# Patient Record
Sex: MALE | Race: WHITE | HISPANIC OR LATINO | Employment: OTHER | URBAN - METROPOLITAN AREA
[De-identification: names, ages, dates, MRNs, and addresses within clinical notes are randomized per-mention and may not be internally consistent; named-entity substitution may affect disease eponyms.]

---

## 2017-10-20 ENCOUNTER — HOSPITAL ENCOUNTER (EMERGENCY)
Facility: CLINIC | Age: 22
Discharge: HOME | End: 2017-10-21
Attending: EMERGENCY MEDICINE | Admitting: EMERGENCY MEDICINE

## 2017-10-20 DIAGNOSIS — T15.02XA FOREIGN BODY OF LEFT CORNEA, INITIAL ENCOUNTER: ICD-10-CM

## 2017-10-20 PROCEDURE — 65220 REMOVE FOREIGN BODY FROM EYE: CPT | Mod: LT

## 2017-10-20 PROCEDURE — 99284 EMERGENCY DEPT VISIT MOD MDM: CPT | Mod: 25

## 2017-10-20 RX ORDER — TETRACAINE HYDROCHLORIDE 5 MG/ML
SOLUTION OPHTHALMIC
Status: DISCONTINUED
Start: 2017-10-20 | End: 2017-10-21 | Stop reason: HOSPADM

## 2017-10-20 RX ORDER — ERYTHROMYCIN 5 MG/G
0.5 OINTMENT OPHTHALMIC 4 TIMES DAILY
Qty: 1 G | Refills: 0 | Status: SHIPPED | OUTPATIENT
Start: 2017-10-20 | End: 2017-10-25

## 2017-10-20 ASSESSMENT — ENCOUNTER SYMPTOMS
EYE PAIN: 1
EYE ITCHING: 1
EYE DISCHARGE: 0

## 2017-10-20 NOTE — ED AVS SNAPSHOT
[facility] Emergency Department    [address]    [facility] [address]    Phone:  [phone]    Fax:  [phone]                                       Castillo Bullard   MRN: [id number]    Department:  [facility] Emergency Department   Date of Visit:  10/20/2017           After Visit Summary Signature Page     I have received my discharge instructions, and my questions have been answered. I have discussed any challenges I see with this plan with the nurse or doctor.    ..........................................................................................................................................  Patient/Patient Representative Signature      ..........................................................................................................................................  Patient Representative Print Name and Relationship to Patient    ..................................................               ................................................  Date                                            Time    ..........................................................................................................................................  Reviewed by Signature/Title    ...................................................              ..............................................  Date                                                            Time

## 2017-10-20 NOTE — ED AVS SNAPSHOT
[facility] Emergency Department    [address]    [facility] [address]    Phone:  [phone]    Fax:  [phone]                                       Castillo Bullard   MRN: [id number]    Department:  [facility] Emergency Department   Date of Visit:  10/20/2017           Patient Information     Date Of Birth          1995        Your diagnoses for this visit were:     Foreign body of left cornea, initial encounter        You were seen by Basilio No MD.      Follow-up Information     Follow up with [facility] OPHTHALMOLOGY DEPT. Go in 1 day.    Contact information:    [address]  [address]  [facility] [phone]        Follow up with [facility] Emergency Department.    Specialty:  EMERGENCY MEDICINE    Why:  If symptoms worsen    Contact information:    [address] [facility] [id number] [phone]        Discharge Instructions       Go to your appointment tomorrow at 9 am.      Particle Removed from Eye with Rust Ring (Corneal Foreign Body)    A metal particle got into the eye and stuck to the cornea (the clear part in the front of the eye). This sensitive area can be very painful after a foreign body is removed. However, it heals rapidly. The pain should go away within 24 to 48 hours.  The metal particle has likely been removed, but tears have caused the metal to rust and form a stain on the cornea.  Many times it is not possible to completely remove a rust ring in a single visit. Therefore, you may have to return for another appointment or be referred to an eye specialist for further treatment.  Home care    A cold pack (ice in a plastic bag, wrapped in a towel) may be applied over the eye for 20 minutes at a time to reduce pain.    Acetaminophen or ibuprofen may be used to control pain, unless another medicine was prescribed. (Note: If you have chronic liver or  kidney disease, or if you have ever had a stomach ulcer or gastrointestinal bleeding, talk with your doctor before using these medicines.)    If you received an eye patch:    An eye patch should not be left in place for more than 24 hours, unless advised to do so by your healthcare provider.    Always keep your return appointment for patch removal and re-exam. Your eye could be harmed if the patch remains in place longer than advised.    Don't drive a motor vehicle or operate machinery with the patch in place. You will have trouble judging distances with only one eye.    If eye drops or ointment were prescribed, use as directed.  Follow-up care    No eye patch: If no patch was used, but the pain continues for more than 48 hours, you should have another eye exam.    Eye patch: If your eye was patched and you were given a return appointment for patch removal and re-exam, don't miss the visit. Again, it could be harmful to your eye if the patch remains in place longer than advised. However, if you were asked to remove the eye patch within 24 hours (or as directed by your healthcare provider), contact your healthcare provider immediately if your pain increases or get worse after removal of the eye patch.  When to seek medical advice  Call your healthcare provider right away if any of these occur:    Increasing eye pain or pain that does not improve after 24 hours    Discharge from the eye    Redness of the eye or swelling of the eyelids    Worsening vision  Date Last Reviewed: 7/1/2017 2000-2017 [employer]. [address] [place] [address]. All rights reserved. This information is not intended as a substitute for professional medical care. Always follow your healthcare professional's instructions.          24 Hour Appointment Hotline       To make an appointment at any [facility], call [phone] ([phone]). If you don't have a family doctor or clinic, we will help you find one.  [facility] are conveniently located to serve the needs of you and your family.             Review of your medicines      START taking        Dose / Directions Last dose taken    erythromycin ophthalmic ointment   Commonly known as:  ROMYCIN   Dose:  0.5 inch   Quantity:  1 g        Place 0.5 inches Into the left eye 4 times daily for 5 days   Refills:  0        oxyCODONE 5 MG IR tablet   Commonly known as:  ROXICODONE   Dose:  5 mg   Quantity:  8 tablet        Take 1 tablet (5 mg) by mouth every 6 hours as needed for pain   Refills:  0                Prescriptions were sent or printed at these locations (2 Prescriptions)                   Other Prescriptions                Printed at Department/Unit printer (2 of 2)         erythromycin (ROMYCIN) ophthalmic ointment               oxyCODONE (ROXICODONE) 5 MG IR tablet                Procedures and tests performed during your visit     CT Temporal Orbital Sella w/o Contrast      Orders Needing Specimen Collection     None      Pending Results     Date and Time Order Name Status Description    10/20/2017 2334 CT Temporal Orbital Sella w/o Contrast Preliminary             Pending Culture Results     No orders found for last 3 day(s).            Pending Results Instructions     If you had any lab results that were not finalized at the time of your Discharge, you can call the ED Lab Result RN at [phone]. You will be contacted by this team for any positive Lab results or changes in treatment. The nurses are available 7 days a week from 10A to 6:30P.  You can leave a message 24 hours per day and they will return your call.        Test Results From Your Hospital Stay        10/21/2017 12:38 AM      Narrative     CT ORBITS WITHOUT CONTRAST  10/21/2017 12:16 AM     HISTORY: The patient works in construction with metals and woods. Left  eye irritation and itchiness. Evaluate for foreign body.    COMPARISON: 5/20/2014 - CT head.    TECHNIQUE: Without intravenous  contrast, helical sections were  acquired through the orbits. Coronal reconstructions were generated.  Radiation dose for this scan was reduced using automated exposure  control, adjustment of the mA and/or kV according to the patient's  size, or iterative reconstruction technique.        Impression     IMPRESSION:  1. No radiopaque foreign object or other acute abnormality visualized  within the orbits.  2. Mild mucosal thickening within bilateral maxillary and ethmoid  sinuses.                Clinical Quality Measure: Blood Pressure Screening     Your blood pressure was checked while you were in the emergency department today. The last reading we obtained was  BP: (!) 133/100 . Please read the guidelines below about what these numbers mean and what you should do about them.  If your systolic blood pressure (the top number) is less than 120 and your diastolic blood pressure (the bottom number) is less than 80, then your blood pressure is normal. There is nothing more that you need to do about it.  If your systolic blood pressure (the top number) is 120-139 or your diastolic blood pressure (the bottom number) is 80-89, your blood pressure may be higher than it should be. You should have your blood pressure rechecked within a year by a primary care provider.  If your systolic blood pressure (the top number) is 140 or greater or your diastolic blood pressure (the bottom number) is 90 or greater, you may have high blood pressure. High blood pressure is treatable, but if left untreated over time it can put you at risk for heart attack, stroke, or kidney failure. You should have your blood pressure rechecked by a primary care provider within the next 4 weeks.  If your provider in the emergency department today gave you specific instructions to follow-up with your doctor or provider even sooner than that, you should follow that instruction and not wait for up to 4 weeks for your follow-up visit.        Thank you for  "choosing [facility]       Thank you for choosing [facility] for your care. Our goal is always to provide you with excellent care. Hearing back from our patients is one way we can continue to improve our services. Please take a few minutes to complete the written survey that you may receive in the mail after you visit with us. Thank you!        [employer] Information     [employer] lets you send messages to your doctor, view your test results, renew your prescriptions, schedule appointments and more. To sign up, go to [email or web address] . Click on \"Log in\" on the left side of the screen, which will take you to the Welcome page. Then click on \"Sign up Now\" on the right side of the page.     You will be asked to enter the access code listed below, as well as some personal information. Please follow the directions to create your username and password.     Your access code is: [email or web address]  Expires: 2018 12:50 AM     Your access code will  in 90 days. If you need help or a new code, please call your [facility] clinic or [phone].        Care EveryWhere ID     This is your Care EveryWhere ID. This could be used by other organizations to access your [facility] medical records  [id number]        Equal Access to Services     LEONARD RUELAS : Jose Alejandro garceso Whitney, waaxda lujaiadaha, qaybta kaalmada adebenitoyada, ugo hand. So [facility] [phone].    ATENCIÓN: Si habla español, tiene a anaya disposición servicios gratuitos de asistencia lingüística. Llame al [phone].    We comply with applicable federal civil rights laws and [place] laws. We do not discriminate on the basis of race, color, national origin, age, disability, sex, sexual orientation, or gender identity.            After Visit Summary       This is your record. Keep this with you and show to your community pharmacist(s) and doctor(s) at your next visit.                  "

## 2017-10-21 ENCOUNTER — APPOINTMENT (OUTPATIENT)
Dept: CT IMAGING | Facility: CLINIC | Age: 22
End: 2017-10-21
Attending: EMERGENCY MEDICINE

## 2017-10-21 ENCOUNTER — OFFICE VISIT (OUTPATIENT)
Dept: OPHTHALMOLOGY | Facility: CLINIC | Age: 22
End: 2017-10-21

## 2017-10-21 VITALS
SYSTOLIC BLOOD PRESSURE: 127 MMHG | RESPIRATION RATE: 16 BRPM | OXYGEN SATURATION: 98 % | HEART RATE: 86 BPM | DIASTOLIC BLOOD PRESSURE: 87 MMHG | TEMPERATURE: 97.8 F

## 2017-10-21 DIAGNOSIS — T15.02XA FOREIGN BODY OF LEFT CORNEA, INITIAL ENCOUNTER: Primary | ICD-10-CM

## 2017-10-21 PROCEDURE — 70480 CT ORBIT/EAR/FOSSA W/O DYE: CPT

## 2017-10-21 RX ORDER — OFLOXACIN 3 MG/ML
1 SOLUTION/ DROPS OPHTHALMIC EVERY 4 HOURS
Qty: 1 BOTTLE | Refills: 11 | Status: SHIPPED | OUTPATIENT
Start: 2017-10-21

## 2017-10-21 RX ORDER — OXYCODONE HYDROCHLORIDE 5 MG/1
5 TABLET ORAL EVERY 6 HOURS PRN
Qty: 8 TABLET | Refills: 0 | Status: SHIPPED | OUTPATIENT
Start: 2017-10-21

## 2017-10-21 RX ORDER — PREDNISOLONE ACETATE 10 MG/ML
1 SUSPENSION/ DROPS OPHTHALMIC 2 TIMES DAILY
Qty: 1 ML | Refills: 0 | Status: SHIPPED | OUTPATIENT
Start: 2017-10-21 | End: 2017-10-28

## 2017-10-21 ASSESSMENT — SLIT LAMP EXAM - LIDS
COMMENTS: NORMAL
COMMENTS: NORMAL

## 2017-10-21 ASSESSMENT — VISUAL ACUITY
METHOD: SNELLEN - LINEAR
OS_SC: 20/20
OD_SC: 20/25
OS_SC+: -1

## 2017-10-21 ASSESSMENT — EXTERNAL EXAM - LEFT EYE: OS_EXAM: NORMAL

## 2017-10-21 ASSESSMENT — EXTERNAL EXAM - RIGHT EYE: OD_EXAM: NORMAL

## 2017-10-21 NOTE — LETTER
October 22, 2017      Re: Castillo Bullard   1995    To Whom It May Concern:    This is to confirm that the above patient was seen on 10/20/17 and 10/21/2017.  Castillo Bullard is able to return to work today but requires followup examination in the ophthalmology clinic this week.    Thank you for your cooperation in this matter.  Please do not hesitate to contact me if you have any further questions.    Sincerely,      TANA MCKEON

## 2017-10-21 NOTE — DISCHARGE INSTRUCTIONS
Go to your appointment tomorrow at 9 am.      Particle Removed from Eye with Rust Ring (Corneal Foreign Body)    A metal particle got into the eye and stuck to the cornea (the clear part in the front of the eye). This sensitive area can be very painful after a foreign body is removed. However, it heals rapidly. The pain should go away within 24 to 48 hours.  The metal particle has likely been removed, but tears have caused the metal to rust and form a stain on the cornea.  Many times it is not possible to completely remove a rust ring in a single visit. Therefore, you may have to return for another appointment or be referred to an eye specialist for further treatment.  Home care    A cold pack (ice in a plastic bag, wrapped in a towel) may be applied over the eye for 20 minutes at a time to reduce pain.    Acetaminophen or ibuprofen may be used to control pain, unless another medicine was prescribed. (Note: If you have chronic liver or kidney disease, or if you have ever had a stomach ulcer or gastrointestinal bleeding, talk with your doctor before using these medicines.)    If you received an eye patch:    An eye patch should not be left in place for more than 24 hours, unless advised to do so by your healthcare provider.    Always keep your return appointment for patch removal and re-exam. Your eye could be harmed if the patch remains in place longer than advised.    Don't drive a motor vehicle or operate machinery with the patch in place. You will have trouble judging distances with only one eye.    If eye drops or ointment were prescribed, use as directed.  Follow-up care    No eye patch: If no patch was used, but the pain continues for more than 48 hours, you should have another eye exam.    Eye patch: If your eye was patched and you were given a return appointment for patch removal and re-exam, don't miss the visit. Again, it could be harmful to your eye if the patch remains in place longer than advised.  However, if you were asked to remove the eye patch within 24 hours (or as directed by your healthcare provider), contact your healthcare provider immediately if your pain increases or get worse after removal of the eye patch.  When to seek medical advice  Call your healthcare provider right away if any of these occur:    Increasing eye pain or pain that does not improve after 24 hours    Discharge from the eye    Redness of the eye or swelling of the eyelids    Worsening vision  Date Last Reviewed: 7/1/2017 2000-2017 [employer]. [address] [place] [address]. All rights reserved. This information is not intended as a substitute for professional medical care. Always follow your healthcare professional's instructions.

## 2017-10-21 NOTE — PROGRESS NOTES
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 He states that he was wearing safety goggles at the time. After a strong oumou of wind, a piece flew into his left eye. Complains of pain, redness, photophobia. He was seen at [facility] last evening with removal of limbal foreign body. CT orbits negative for intraocular foreign body.          POHx: denies  PMHx: denies      Current Eye Medications:  Erythromcyin ointment TID    Assessment & Plan:  (T15.02XA) Foreign body of left cornea, initial encounter  (primary encounter diagnosis)  Plan:         ophthalmic susp        Metallic rust ring burred at slit lamp with residual epi defect ~1mm in diameter  Start ofloxacin q4h  Start PFAT every hour as needed  Will RETURN TO CLINIC tomorrow and asses for improvement, then will start pred forte    Patient discussed with MD Rubin Chamberlain MD  PGY3, Dept of Ophthalmology  Pager [phone]      Complete documentation of historical and exam elements from today's encounter can be found in the full encounter summary report (not reduplicated in this progress note). I personally obtained the chief complaint(s) and history of present illness.  I confirmed and edited as necessary the review of systems, past medical/surgical history, family history, social history, and examination findings as documented by others; and I examined the patient myself. I personally reviewed the relevant tests, images, and reports as documented above. I formulated and edited as necessary the assessment and plan and discussed the findings and management plan with the patient and family.     Demond Estrada, DO

## 2017-10-21 NOTE — ED PROVIDER NOTES
History     Chief Complaint:  Eye Problem      HPI   Castillo Bullard is a 22 year old male who presents to the emergency department today for evaluation of an eye problem. The patient reports left eye irritation and itchiness that began around 8 hours prior to arrival. 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 He first noticed the pain while he was walking and believes that there may be dust in his wind. The patient denies wearing any contacts or glasses. He is not aware of any specific metallic fragment while hammering though cannot be sure this was not when it occurred.  He does not wear contacts or glasses. He denies any vision changes or eye drainage. Of note, the patient's last tetanus shot was in 2012.     Allergies:  No Known Drug Allergies     Medications:    The patient is currently on no regular medications.    Past Medical History:    History reviewed. No pertinent past medical history.    Past Surgical History:    History reviewed. No pertinent past surgical history.     Family History:    History reviewed. No pertinent family history.     Social History:  The patient was accompanied to the ED by his son.  Smoking Status: Never Smoker  Smokeless Tobacco: Never Used  Alcohol Use: Yes   Marital Status:  Single      Review of Systems   Eyes: Positive for pain (left) and itching (left). Negative for discharge and visual disturbance.   All other systems reviewed and are negative.    Physical Exam   First Vitals:  BP: (!) 133/100  Pulse: 86  Heart Rate: 86  Temp: 97.8  F (36.6  C)  Resp: 18  SpO2: 99 %    Physical Exam  General:                        Well-nourished                        Speaking in full sentences  Eyes:                        VA: 20/20 on R, 20/20 on L                        Pupils are 3 mm on R, 3 mm on L                        Anisocoria absent                        Ptosis absent                        Proptosis absent                         EOM are full without entrapment                        Conjunctiva with injection on left                        Lids are without erythema, drainage, lesions, obvious foreign body                        Slit Lamp Exam: Metallic foreign body at 7 o'clock position just inferior to visual axis, negative Siedel sign,   ENT:                        Moist mucous membranes                        Posterior oropharynx clear without erythema or exudate  Neck:                        Supple with full ROM  Resp:                        Even, non-labored respirations  CV:                                        RRR  GI:                        BS present                        Abdomen soft without distention                        Non-tender to light and deep palpation                        No guarding or rebound tenderness  Skin:                        Warm, dry, well perfused                        No rashes or open wounds on exposed skin  MSK:                        Moves all extremities                        No focal deformities or swelling      Neuro:                        Alert                        Answers questions appropriately                        Moves all extremities equally                        Gait stable  Psych:                        Normal affect, normal mood        Emergency Department Course     Imaging:  Radiology findings were communicated with the patient who voiced understanding of the findings.    CT Temporal Orbital Sella w/o Contrast  IMPRESSION:  1. No radiopaque foreign object or other acute abnormality visualized  within the orbits.  2. Mild mucosal thickening within bilateral maxillary and ethmoid  sinuses.  Report per radiology     Interventions:  Tetracaine 0.5% ophthalmic solution  Fluorescein opthalmic strip 1%     Procedure:     Foreign Body Removal        LOCATION:  Left eye      FOREIGN BODY: Metal    PROCEDURE: The metal was grasped using a cotton swab and the foreign body was  successfully removed. There were no immediate complications. The patient tolerated the procedure well.     Emergency Department Course:  Nursing notes and vitals reviewed.  2232: I performed an exam of the patient as documented above.   The patient was sent for a CT Temporal Orbital Sella w/o Contrast while in the emergency department, results above.    2309: Patient rechecked and updated.  I performed an eye exam as per above and the foreign body was removed after application of tetracaine and fluorescein.   2330: I spoke with Dr. Cagle of the Opthalmology service from the [facility] regarding patient's presentation, findings, and plan of care. He recommended CT Temporal Orbital Sella w/o Contrast to rule out intraocular foreign body.   0048: Patient rechecked and updated.    Findings and plan explained to the Patient. Patient discharged home with instructions regarding supportive care, medications, and reasons to return. The importance of close follow-up was reviewed. The patient was prescribed erythromycin ophthalmic ointment.   I personally reviewed the imaging results with the Patient and answered all related questions prior to discharge.     Impression & Plan      Medical Decision Making:  Castillo Bullard is a pleasant 22 year old male presenting to the ER for evaluation of left eye discomfort. History, exam and ED course are consistent with ocular foreign body. Examination reveals evidence of a metallic foreign body at approximately 7 o'clock position with associated rust ring. This was successfully removed after application of topical tetracaine and cotton swab. Remainder of exam reveals no evidence of retained foreign body or foreign body beneath lids. As patient does work using metal, CT of the orbit was obtained, which does not reveal evidence of intraocular foreign body. Visual acuities are symmetric bilaterally. Patient is not a contact lens user. Tetanus status is up to  date. No evidence of occular perforation and no evidence of Wes's sign. Case was discussed with Opthalmology at the [facility] to help expedite close follow up. They would like to see the patient tomorrow morning at 9 am. He will be discharged home with prescription for erythromycin eye ointment to be applied four times daily x 5 days. Referral information and direction provided to establish close follow up. Patient was additionally provided a short course of oral oxycodone to be taken as needed for break through pain. Opioid and driving precautions were discussed. He is to return to the ER at any point with severe pain, vision loss or any other new or troubling symptoms. All questions were answered piror to discharge.     Diagnosis:    ICD-10-CM    1. Foreign body of left cornea, initial encounter T15.02XA      Disposition:  discharged to home with below prescription    Discharge Medications:  New Prescriptions    ERYTHROMYCIN (ROMYCIN) OPHTHALMIC OINTMENT    Place 0.5 inches Into the left eye 4 times daily for 5 days    OXYCODONE (ROXICODONE) 5 MG IR TABLET    Take 1 tablet (5 mg) by mouth every 6 hours as needed for pain     Scribe Disclosure:  JEANINE, Tosha Samano, am serving as a scribe at 11:11 PM on 10/20/2017 to document services personally performed by Basilio No MD based on my observations and the provider's statements to me.    10/20/2017   [facility] EMERGENCY DEPARTMENT         Basilio No MD  10/21/17 0127

## 2017-10-21 NOTE — ED NOTES
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 Denies any vision changes or drainage. ABCs intact, gcs 15, AA&Ox4.

## 2017-10-21 NOTE — MR AVS SNAPSHOT
After Visit Summary   10/21/2017    Castillo Bullard    MRN: [id number]           Patient Information     Date Of Birth          1995        Visit Information        Provider Department      10/21/2017 10:50 AM Rubin Butt [facility]        Today's Diagnoses     Foreign body of left cornea, initial encounter    -  1       Follow-ups after your visit        Follow-up notes from your care team     Return in about 1 day (around 10/22/2017).      Who to contact     Please call your clinic at [phone] to:    Ask questions about your health    Make or cancel appointments    Discuss your medicines    Learn about your test results    Speak to your doctor            Additional Information About Your Visit        MyChart Information     [employer] is an electronic gateway that provides easy, online access to your medical records. With [employer], you can request a clinic appointment, read your test results, renew a prescription or communicate with your care team.     To sign up for MIKESTARt visit the website at [email or web address]   You will be asked to enter the access code listed below, as well as some personal information. Please follow the directions to create your username and password.     Your access code is: [email or web address]  Expires: 2018  5:22 PM     Your access code will  in 90 days. If you need help or a new code, please contact your [facility] or call [phone] for assistance.        Care EveryWhere ID     This is your Care EveryWhere ID. This could be used by other organizations to access your [facility] medical records  [id number]         Blood Pressure from Last 3 Encounters:   10/21/17 127/87   14 108/62   14 111/57    Weight from Last 3 Encounters:   14 65.8 kg (145 lb)   14 63.5 kg (139 lb 14.4 oz)              Today, you had the following     No orders found for display         Today's  Medication Changes          These changes are accurate as of 10/21/17 11:59 PM.  If you have any questions, ask your nurse or doctor.               Start taking these medicines.        Dose/Directions    ofloxacin 0.3 % ophthalmic solution   Commonly known as:  OCUFLOX   Used for:  Foreign body of left cornea, initial encounter   Started by:  Rubin Butt MD        Dose:  1 drop   Place 1 drop Into the left eye every 4 hours   Quantity:  1 Bottle   Refills:  11       prednisoLONE acetate 1 % ophthalmic susp   Commonly known as:  PRED FORTE   Used for:  Foreign body of left cornea, initial encounter   Started by:  Rubin Butt MD        Dose:  1 drop   Place 1 drop Into the left eye 2 times daily for 7 days   Quantity:  1 mL   Refills:  0            Where to get your medicines      [facility] [address] [place] [id number]     Phone:  [phone]     ofloxacin 0.3 % ophthalmic solution    prednisoLONE acetate 1 % ophthalmic susp                Primary Care Provider Fax #    Physician No Ref-Primary [phone]       No address on file        Equal Access to Services     [facility]ARUNA [facility] AH: Hadii nguyễn garceso Soparish, waaxda luqadaha, qaybta kaalmada adejoseph, ugo hendrickson . So [facility] [phone].    ATENCIÓN: Si habla español, tiene a anaya disposición servicios gratuitos de asistencia lingüística. [facility] [phone].    We comply with applicable federal civil rights laws and [place] laws. We do not discriminate on the basis of race, color, national origin, age, disability, sex, sexual orientation, or gender identity.            Thank you!     Thank you for choosing EYE CLINIC  for your care. Our goal is always to provide you with excellent care. Hearing back from our patients is one way we can continue to improve our services.  Please take a few minutes to complete the written survey that you may receive in the mail after your visit with us. Thank you!             Your Updated Medication List - Protect others around you: Learn how to safely use, store and throw away your medicines at www.disposemymeds.org.          This list is accurate as of 10/21/17 11:59 PM.  Always use your most recent med list.                   Brand Name Dispense Instructions for use Diagnosis    erythromycin ophthalmic ointment    ROMYCIN    1 g    Place 0.5 inches Into the left eye 4 times daily for 5 days        ofloxacin 0.3 % ophthalmic solution    OCUFLOX    1 Bottle    Place 1 drop Into the left eye every 4 hours    Foreign body of left cornea, initial encounter       oxyCODONE IR 5 MG tablet    ROXICODONE    8 tablet    Take 1 tablet (5 mg) by mouth every 6 hours as needed for pain        prednisoLONE acetate 1 % ophthalmic susp    PRED FORTE    1 mL    Place 1 drop Into the left eye 2 times daily for 7 days    Foreign body of left cornea, initial encounter

## 2017-10-22 ENCOUNTER — OFFICE VISIT (OUTPATIENT)
Dept: OPHTHALMOLOGY | Facility: CLINIC | Age: 22
End: 2017-10-22

## 2017-10-22 DIAGNOSIS — T15.02XA FOREIGN BODY OF LEFT CORNEA, INITIAL ENCOUNTER: Primary | ICD-10-CM

## 2017-10-22 ASSESSMENT — VISUAL ACUITY
OS_SC: 20/20
METHOD: SNELLEN - LINEAR
OD_SC: 20/20

## 2017-10-22 ASSESSMENT — EXTERNAL EXAM - LEFT EYE: OS_EXAM: NORMAL

## 2017-10-22 ASSESSMENT — SLIT LAMP EXAM - LIDS
COMMENTS: NORMAL
COMMENTS: NORMAL

## 2017-10-22 ASSESSMENT — EXTERNAL EXAM - RIGHT EYE: OD_EXAM: NORMAL

## 2017-10-22 NOTE — PROGRESS NOTES
Interval History:  Notes improved photophobia and redness, using ofloxacin. Denies vision change, flashes or floaters    HPI  Castillo Bullard is a 22 year old male who was working with metal 2 days ago, cutting and hammering. He states that he was wearing safety goggles at the time. After a strong oumou of wind, a piece flew into his left eye. Complains of pain, redness, photophobia. He was seen at [facility] last evening with removal of limbal foreign body. CT orbits negative for intraocular foreign body.          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

## 2017-10-22 NOTE — MR AVS SNAPSHOT
After Visit Summary   10/22/2017    Castillo Bullard    MRN: [id number]           Patient Information     Date Of Birth          1995        Visit Information        Provider Department      10/22/2017 10:35 AM Rubin [facility]t MD [facility]        Today's Diagnoses     Foreign body of left cornea, initial encounter    -  1       Follow-ups after your visit        Who to contact     Please call your clinic at [phone] to:    Ask questions about your health    Make or cancel appointments    Discuss your medicines    Learn about your test results    Speak to your doctor   If you have compliments or concerns about an experience at your clinic, or if you wish to file a complaint, please contact [facility] Relations at [phone] or email us at [email or web address]         Additional Information About Your Visit        MyChart Information     [employer] is an electronic gateway that provides easy, online access to your medical records. With [employer], you can request a clinic appointment, read your test results, renew a prescription or communicate with your care team.     To sign up for [employer]ugg [employer] visit the website at [email or web address]   You will be asked to enter the access code listed below, as well as some personal information. Please follow the directions to create your username and password.     Your access code is: [email or web address]  Expires: 2018 11:50 PM     Your access code will  in 90 days. If you need help or a new code, please contact your [facility] or call [phone] for assistance.        Care EveryWhere ID     This is your Care EveryWhere ID. This could be used by other organizations to access your [place] medical records  [id number]         Blood Pressure from Last 3 Encounters:   10/21/17 127/87   14 108/62   14 111/57    Weight from Last 3 Encounters:    06/30/14 65.8 kg (145 lb)   05/20/14 63.5 kg (139 lb 14.4 oz)              Today, you had the following     No orders found for display       Primary Care Provider    Physician No Ref-Primary       NO REF-PRIMARY PHYSICIAN        Equal Access to Services     YOGESHKARUNA SURAJ : Hadii nguyễn mcknight michelle Soparish, wajuan albertoda luqadaha, qaybta kaalmada fartun, ugo carpenter ondinabenito elnea laamandadoug . So [facility] [phone].    ATENCIÓN: Si habla español, tiene a anaya disposición servicios gratuitos de asistencia lingüística. Llame al [phone].    We comply with applicable federal civil rights laws and [place] laws. We do not discriminate on the basis of race, color, national origin, age, disability, sex, sexual orientation, or gender identity.            Thank you!     Thank you for choosing [facility]YE CLINIC  for your care. Our goal is always to provide you with excellent care. Hearing back from our patients is one way we can continue to improve our services. Please take a few minutes to complete the written survey that you may receive in the mail after your visit with us. Thank you!             Your Updated Medication List - Protect others around you: Learn how to safely use, store and throw away your medicines at www.disposemymeds.org.          This list is accurate as of: 10/22/17 11:59 PM.  Always use your most recent med list.                   Brand Name Dispense Instructions for use Diagnosis    erythromycin ophthalmic ointment    ROMYCIN    1 g    Place 0.5 inches Into the left eye 4 times daily for 5 days        ofloxacin 0.3 % ophthalmic solution    OCUFLOX    1 Bottle    Place 1 drop Into the left eye every 4 hours    Foreign body of left cornea, initial encounter       oxyCODONE IR 5 MG tablet    ROXICODONE    8 tablet    Take 1 tablet (5 mg) by mouth every 6 hours as needed for pain        prednisoLONE acetate 1 % ophthalmic susp    PRED FORTE    1 mL    Place 1 drop Into the left eye 2 times daily for 7 days     Foreign body of left cornea, initial encounter

## 2017-10-30 ENCOUNTER — TELEPHONE (OUTPATIENT)
Dept: OPHTHALMOLOGY | Facility: CLINIC | Age: 22
End: 2017-10-30

## 2024-06-29 LAB
ALBUMIN SERPL BCG-MCNC: 4.4 G/DL (ref 3.5–5.2)
ALP SERPL-CCNC: 86 U/L (ref 40–150)
ALT SERPL W P-5'-P-CCNC: 101 U/L (ref 0–70)
ANION GAP SERPL CALCULATED.3IONS-SCNC: 13 MMOL/L (ref 7–15)
AST SERPL W P-5'-P-CCNC: 217 U/L (ref 0–45)
BASOPHILS # BLD AUTO: 0 10E3/UL (ref 0–0.2)
BASOPHILS NFR BLD AUTO: 0 %
BILIRUB SERPL-MCNC: <0.2 MG/DL
BUN SERPL-MCNC: 13.5 MG/DL (ref 6–20)
CALCIUM SERPL-MCNC: 9.2 MG/DL (ref 8.6–10)
CHLORIDE SERPL-SCNC: 107 MMOL/L (ref 98–107)
CREAT SERPL-MCNC: 1 MG/DL (ref 0.67–1.17)
DEPRECATED HCO3 PLAS-SCNC: 23 MMOL/L (ref 22–29)
EGFRCR SERPLBLD CKD-EPI 2021: >90 ML/MIN/1.73M2
EOSINOPHIL # BLD AUTO: 0.2 10E3/UL (ref 0–0.7)
EOSINOPHIL NFR BLD AUTO: 2 %
ERYTHROCYTE [DISTWIDTH] IN BLOOD BY AUTOMATED COUNT: 12.9 % (ref 10–15)
GLUCOSE SERPL-MCNC: 119 MG/DL (ref 70–99)
HCT VFR BLD AUTO: 45.5 % (ref 40–53)
HGB BLD-MCNC: 15.1 G/DL (ref 13.3–17.7)
IMM GRANULOCYTES # BLD: 0 10E3/UL
IMM GRANULOCYTES NFR BLD: 0 %
LYMPHOCYTES # BLD AUTO: 2.6 10E3/UL (ref 0.8–5.3)
LYMPHOCYTES NFR BLD AUTO: 27 %
MCH RBC QN AUTO: 29.5 PG (ref 26.5–33)
MCHC RBC AUTO-ENTMCNC: 33.2 G/DL (ref 31.5–36.5)
MCV RBC AUTO: 89 FL (ref 78–100)
MONOCYTES # BLD AUTO: 0.6 10E3/UL (ref 0–1.3)
MONOCYTES NFR BLD AUTO: 6 %
NEUTROPHILS # BLD AUTO: 6.4 10E3/UL (ref 1.6–8.3)
NEUTROPHILS NFR BLD AUTO: 65 %
NRBC # BLD AUTO: 0 10E3/UL
NRBC BLD AUTO-RTO: 0 /100
PLATELET # BLD AUTO: 231 10E3/UL (ref 150–450)
POTASSIUM SERPL-SCNC: 4.1 MMOL/L (ref 3.4–5.3)
PROT SERPL-MCNC: 7.3 G/DL (ref 6.4–8.3)
RBC # BLD AUTO: 5.12 10E6/UL (ref 4.4–5.9)
SODIUM SERPL-SCNC: 143 MMOL/L (ref 135–145)
WBC # BLD AUTO: 9.8 10E3/UL (ref 4–11)

## 2024-06-29 PROCEDURE — 80053 COMPREHEN METABOLIC PANEL: CPT | Performed by: EMERGENCY MEDICINE

## 2024-06-29 PROCEDURE — 99285 EMERGENCY DEPT VISIT HI MDM: CPT | Mod: 25

## 2024-06-29 PROCEDURE — 85025 COMPLETE CBC W/AUTO DIFF WBC: CPT | Performed by: EMERGENCY MEDICINE

## 2024-06-29 PROCEDURE — 36415 COLL VENOUS BLD VENIPUNCTURE: CPT | Performed by: EMERGENCY MEDICINE

## 2024-06-29 PROCEDURE — 83690 ASSAY OF LIPASE: CPT | Performed by: EMERGENCY MEDICINE

## 2024-06-29 ASSESSMENT — COLUMBIA-SUICIDE SEVERITY RATING SCALE - C-SSRS
6. HAVE YOU EVER DONE ANYTHING, STARTED TO DO ANYTHING, OR PREPARED TO DO ANYTHING TO END YOUR LIFE?: NO
1. IN THE PAST MONTH, HAVE YOU WISHED YOU WERE DEAD OR WISHED YOU COULD GO TO SLEEP AND NOT WAKE UP?: NO
2. HAVE YOU ACTUALLY HAD ANY THOUGHTS OF KILLING YOURSELF IN THE PAST MONTH?: NO

## 2024-06-30 ENCOUNTER — APPOINTMENT (OUTPATIENT)
Dept: ULTRASOUND IMAGING | Facility: CLINIC | Age: 29
End: 2024-06-30
Attending: EMERGENCY MEDICINE

## 2024-06-30 ENCOUNTER — HOSPITAL ENCOUNTER (EMERGENCY)
Facility: CLINIC | Age: 29
Discharge: HOME | End: 2024-06-30
Attending: EMERGENCY MEDICINE | Admitting: EMERGENCY MEDICINE

## 2024-06-30 ENCOUNTER — APPOINTMENT (OUTPATIENT)
Dept: CT IMAGING | Facility: CLINIC | Age: 29
End: 2024-06-30
Attending: EMERGENCY MEDICINE

## 2024-06-30 VITALS
TEMPERATURE: 98.9 F | HEART RATE: 69 BPM | BODY MASS INDEX: 31.13 KG/M2 | OXYGEN SATURATION: 99 % | HEIGHT: 64 IN | RESPIRATION RATE: 18 BRPM | SYSTOLIC BLOOD PRESSURE: 121 MMHG | WEIGHT: 182.32 LBS | DIASTOLIC BLOOD PRESSURE: 82 MMHG

## 2024-06-30 DIAGNOSIS — R79.89 ELEVATED LFTS: ICD-10-CM

## 2024-06-30 DIAGNOSIS — R10.9 ABDOMINAL PAIN OF UNKNOWN CAUSE: ICD-10-CM

## 2024-06-30 DIAGNOSIS — K76.0 HEPATIC STEATOSIS: ICD-10-CM

## 2024-06-30 LAB
ALBUMIN UR-MCNC: 200 MG/DL
APPEARANCE UR: CLEAR
BILIRUB UR QL STRIP: NEGATIVE
COLOR UR AUTO: ABNORMAL
GLUCOSE UR STRIP-MCNC: NEGATIVE MG/DL
HGB UR QL STRIP: NEGATIVE
HOLD SPECIMEN: NORMAL
HOLD SPECIMEN: NORMAL
KETONES UR STRIP-MCNC: NEGATIVE MG/DL
LEUKOCYTE ESTERASE UR QL STRIP: ABNORMAL
LIPASE SERPL-CCNC: 40 U/L (ref 13–60)
MUCOUS THREADS #/AREA URNS LPF: PRESENT /LPF
NITRATE UR QL: NEGATIVE
PH UR STRIP: 7.5 [PH] (ref 5–7)
RBC URINE: <1 /HPF
SP GR UR STRIP: 1.01 (ref 1–1.03)
SQUAMOUS EPITHELIAL: 1 /HPF
UROBILINOGEN UR STRIP-MCNC: NORMAL MG/DL
WBC URINE: 2 /HPF

## 2024-06-30 PROCEDURE — 81001 URINALYSIS AUTO W/SCOPE: CPT | Performed by: EMERGENCY MEDICINE

## 2024-06-30 PROCEDURE — 87086 URINE CULTURE/COLONY COUNT: CPT | Performed by: EMERGENCY MEDICINE

## 2024-06-30 PROCEDURE — 250N000011 HC RX IP 250 OP 636: Performed by: EMERGENCY MEDICINE

## 2024-06-30 PROCEDURE — 250N000013 HC RX MED GY IP 250 OP 250 PS 637: Performed by: EMERGENCY MEDICINE

## 2024-06-30 PROCEDURE — 74177 CT ABD & PELVIS W/CONTRAST: CPT

## 2024-06-30 PROCEDURE — 76705 ECHO EXAM OF ABDOMEN: CPT

## 2024-06-30 RX ORDER — FAMOTIDINE 20 MG/1
20 TABLET, FILM COATED ORAL 2 TIMES DAILY
Qty: 60 TABLET | Refills: 0 | Status: SHIPPED | OUTPATIENT
Start: 2024-06-30 | End: 2024-07-30

## 2024-06-30 RX ORDER — ONDANSETRON 4 MG/1
4 TABLET, ORALLY DISINTEGRATING ORAL EVERY 8 HOURS PRN
Qty: 10 TABLET | Refills: 0 | Status: SHIPPED | OUTPATIENT
Start: 2024-06-30 | End: 2024-07-03

## 2024-06-30 RX ORDER — IOPAMIDOL 755 MG/ML
500 INJECTION, SOLUTION INTRAVASCULAR ONCE
Status: COMPLETED | OUTPATIENT
Start: 2024-06-30 | End: 2024-06-30

## 2024-06-30 RX ORDER — MAGNESIUM HYDROXIDE/ALUMINUM HYDROXICE/SIMETHICONE 120; 1200; 1200 MG/30ML; MG/30ML; MG/30ML
30 SUSPENSION ORAL ONCE
Status: COMPLETED | OUTPATIENT
Start: 2024-06-30 | End: 2024-06-30

## 2024-06-30 RX ORDER — FAMOTIDINE 20 MG/1
20 TABLET, FILM COATED ORAL ONCE
Status: COMPLETED | OUTPATIENT
Start: 2024-06-30 | End: 2024-06-30

## 2024-06-30 RX ADMIN — IOPAMIDOL 92 ML: 755 INJECTION, SOLUTION INTRAVENOUS at 01:25

## 2024-06-30 RX ADMIN — FAMOTIDINE 20 MG: 20 TABLET ORAL at 02:49

## 2024-06-30 RX ADMIN — ALUMINUM HYDROXIDE, MAGNESIUM HYDROXIDE, AND SIMETHICONE 30 ML: 1200; 120; 1200 SUSPENSION ORAL at 02:49

## 2024-06-30 ASSESSMENT — ACTIVITIES OF DAILY LIVING (ADL)
ADLS_ACUITY_SCORE: 35

## 2024-06-30 NOTE — ED TRIAGE NOTES
Pt arrives with complain of general abdominal pain for 4-5. Denies vomiting and diarrhea. Movement worsens pain in RLQ. A&Ox4.

## 2024-07-01 LAB — BACTERIA UR CULT: NORMAL

## 2024-11-07 ENCOUNTER — APPOINTMENT (OUTPATIENT)
Dept: RADIOLOGY | Facility: CLINIC | Age: 29
End: 2024-11-07
Attending: EMERGENCY MEDICINE

## 2024-11-07 ENCOUNTER — HOSPITAL ENCOUNTER (EMERGENCY)
Facility: CLINIC | Age: 29
Discharge: HOME | End: 2024-11-07
Attending: EMERGENCY MEDICINE | Admitting: EMERGENCY MEDICINE

## 2024-11-07 VITALS
OXYGEN SATURATION: 94 % | TEMPERATURE: 97.8 F | HEART RATE: 103 BPM | SYSTOLIC BLOOD PRESSURE: 150 MMHG | HEIGHT: 64 IN | WEIGHT: 170 LBS | RESPIRATION RATE: 17 BRPM | BODY MASS INDEX: 29.02 KG/M2 | DIASTOLIC BLOOD PRESSURE: 69 MMHG

## 2024-11-07 DIAGNOSIS — J20.9 ACUTE BRONCHITIS, UNSPECIFIED ORGANISM: ICD-10-CM

## 2024-11-07 LAB
FLUAV RNA SPEC QL NAA+PROBE: NEGATIVE
FLUBV RNA RESP QL NAA+PROBE: NEGATIVE
RSV RNA SPEC NAA+PROBE: NEGATIVE
SARS-COV-2 RNA RESP QL NAA+PROBE: NEGATIVE

## 2024-11-07 PROCEDURE — 99285 EMERGENCY DEPT VISIT HI MDM: CPT | Mod: 25

## 2024-11-07 PROCEDURE — 93005 ELECTROCARDIOGRAM TRACING: CPT | Performed by: EMERGENCY MEDICINE

## 2024-11-07 PROCEDURE — 87637 SARSCOV2&INF A&B&RSV AMP PRB: CPT | Performed by: EMERGENCY MEDICINE

## 2024-11-07 PROCEDURE — 250N000009 HC RX 250: Performed by: EMERGENCY MEDICINE

## 2024-11-07 PROCEDURE — 71046 X-RAY EXAM CHEST 2 VIEWS: CPT

## 2024-11-07 PROCEDURE — 94640 AIRWAY INHALATION TREATMENT: CPT

## 2024-11-07 PROCEDURE — 999N000157 HC STATISTIC RCP TIME EA 10 MIN

## 2024-11-07 RX ORDER — IPRATROPIUM BROMIDE AND ALBUTEROL SULFATE 2.5; .5 MG/3ML; MG/3ML
3 SOLUTION RESPIRATORY (INHALATION) ONCE
Status: COMPLETED | OUTPATIENT
Start: 2024-11-07 | End: 2024-11-07

## 2024-11-07 RX ORDER — DOXYCYCLINE 100 MG/1
100 CAPSULE ORAL 2 TIMES DAILY
Qty: 14 CAPSULE | Refills: 0 | Status: SHIPPED | OUTPATIENT
Start: 2024-11-07

## 2024-11-07 RX ORDER — ALBUTEROL SULFATE 90 UG/1
2 INHALANT RESPIRATORY (INHALATION) EVERY 6 HOURS PRN
Qty: 18 G | Refills: 0 | Status: SHIPPED | OUTPATIENT
Start: 2024-11-07

## 2024-11-07 RX ORDER — PREDNISONE 20 MG/1
TABLET ORAL
Qty: 10 TABLET | Refills: 0 | Status: SHIPPED | OUTPATIENT
Start: 2024-11-07

## 2024-11-07 RX ADMIN — IPRATROPIUM BROMIDE AND ALBUTEROL SULFATE 3 ML: .5; 3 SOLUTION RESPIRATORY (INHALATION) at 12:37

## 2024-11-07 ASSESSMENT — COLUMBIA-SUICIDE SEVERITY RATING SCALE - C-SSRS
2. HAVE YOU ACTUALLY HAD ANY THOUGHTS OF KILLING YOURSELF IN THE PAST MONTH?: NO
6. HAVE YOU EVER DONE ANYTHING, STARTED TO DO ANYTHING, OR PREPARED TO DO ANYTHING TO END YOUR LIFE?: NO
1. IN THE PAST MONTH, HAVE YOU WISHED YOU WERE DEAD OR WISHED YOU COULD GO TO SLEEP AND NOT WAKE UP?: NO

## 2024-11-07 NOTE — ED TRIAGE NOTES
Pt c/o cough since last week, pt states is getting better but is not going away. Pt states coughing up a lot of yellow phlegm. Pt reports fevers but hasn't checked, taking motrin at night. Pt states cough wakes him up at night and sweating at night. States negative covid test.  Pt states pain with coughing to abdominal area and chest but only with coughing. Denies hx asthma, sounds congested/wheezy in triage, speaking full clear sentences in breathing non labored.     After triage is almost done pt started to c/o chest tightness after coughing, EKG done.

## 2024-11-07 NOTE — ED PROVIDER NOTES
EMERGENCY DEPARTMENT ENCOUNTER      NAME: Castillo Bullard  AGE: 29 year old male  YOB: 1995  MRN: [id number]  EVALUATION DATE & TIME: No admission date for patient encounter.    PCP: No Ref-Primary, Physician    ED PROVIDER: Steven Zimmerman M.D.      Chief Complaint   Patient presents with    Cough         FINAL IMPRESSION:  No diagnosis found.      ED COURSE & MEDICAL DECISION MAKING:    Pertinent Labs & Imaging studies reviewed. (See chart for details)  29 year old male presents to the Emergency Department for evaluation of cough, shortness of breath, nighttime fevers.  Symptoms ongoing for the last few days.  Lives alone.  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  Did test for COVID a few days ago at [facility] and was negative.  Exam reveals well-nourished follow-up male mild distress.  Dry cough noted.  Oxygenation appropriate.  Lungs diminished with wheezing throughout.  Card exam unremarkable.  EKG obtained on arrival per protocol normal.  Patient likely with potential bacterial pneumonia versus viral process.  Possible COVID/RSV/influenza.  Swab sent.  Chest x-ray being obtained.  Will treat symptomatically with DuoNeb.  Given presentation and young age no indications for laboratory evaluation at this time.  Do not suspect ACS.. Patient appears non toxic with stable vitals signs.  Patient seen in triage area due to ED overcrowding.    12:16 PM I met with the patient for the initial interview and physical examination. Discussed plan for treatment and workup in the ED.    1:10 PM.  Viral swab for COVID/RSV/influenza is negative.  Chest x-ray reviewed.  Patient with likely infiltrative disease in the right lower lobe.  Awaiting definitive report.   1:28 PM.  Radiology reports no infiltrative disease.  Will reassess patient to response to nebulization.  Patient reports feeling improved after nebulization.  Will discharge with doxycycline out of caution along with prednisone  and albuterol.  Routine return precautions given       At the conclusion of the encounter I discussed the results of all of the tests and the disposition. The questions were answered and return precautions provided. The patient or family acknowledged understanding and was agreeable with the care plan.         MEDICATIONS GIVEN IN THE EMERGENCY:  Medications - No data to display    NEW PRESCRIPTIONS STARTED AT TODAY'S ER VISIT  Current Discharge Medication List        START taking these medications    Details   albuterol (PROAIR HFA/PROVENTIL HFA/VENTOLIN HFA) 108 (90 Base) MCG/ACT inhaler Inhale 2 puffs into the lungs every 6 hours as needed for shortness of breath, wheezing or cough.  Qty: 18 g, Refills: 0    Comments: Pharmacy may dispense brand covered by insurance (Proair, or proventil or ventolin or generic albuterol inhaler)      doxycycline hyclate (VIBRAMYCIN) 100 MG capsule Take 1 capsule (100 mg) by mouth 2 times daily.  Qty: 14 capsule, Refills: 0      predniSONE (DELTASONE) 20 MG tablet Take two tablets (= 40mg) each day for 5 (five) days  Qty: 10 tablet, Refills: 0                 =================================================================    HPI    Patient information was obtained from: patient     Use of Intrepreter: N/A         Castillo Bullard is a 29 year old male with no known pertinent medical history, who presents to the ED for evaluation of cough.     The patient reports ongoing cough for 1 week. He said he has a fever and he occasional coughs up phlegm. He has no sick contacts at home, as he lives alone. 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 No history of asthma.       REVIEW OF SYSTEMS   Constitutional:  Denies chills  Respiratory:  Denies increased work of breathing  Cardiovascular:  Denies chest pain, palpitations  GI:  Denies abdominal pain, nausea, vomiting, or change in bowel or bladder habits   Musculoskeletal:  Denies any new muscle/joint  "swelling  Skin:  Denies rash   Neurologic:  Denies focal weakness  All systems negative except as marked.     PAST MEDICAL HISTORY:  No past medical history on file.    PAST SURGICAL HISTORY:  No past surgical history on file.      CURRENT MEDICATIONS:    No current facility-administered medications for this encounter.    Current Outpatient Medications:     ofloxacin (OCUFLOX) 0.3 % ophthalmic solution, Place 1 drop Into the left eye every 4 hours, Disp: 1 Bottle, Rfl: 11    oxyCODONE (ROXICODONE) 5 MG IR tablet, Take 1 tablet (5 mg) by mouth every 6 hours as needed for pain, Disp: 8 tablet, Rfl: 0    ALLERGIES:  No Known Allergies    FAMILY HISTORY:  No family history on file.    SOCIAL HISTORY:   Social History     Socioeconomic History    Marital status: Single   Tobacco Use    Smoking status: Never    Smokeless tobacco: Never   Substance and Sexual Activity    Alcohol use: Yes     Comment: Rare     Drug use: No    Sexual activity: Yes     Partners: Female       VITALS:  Patient Vitals for the past 24 hrs:   BP Temp Temp src Pulse Resp SpO2 Height Weight   11/07/24 1158 (!) 150/69 97.8  F (36.6  C) Temporal 97 24 94 % 1.626 m (5' 4\") 77.1 kg (170 lb)        PHYSICAL EXAM    Constitutional:  Awake, alert, moderate apparent distress  HENT:  Normocephalic, Atraumatic. Bilateral external ears normal. Oropharynx moist. Nose normal. Neck- Normal range of motion with no guarding, No midline cervical tenderness, Supple, No stridor.   Eyes:  PERRL, EOMI with no signs of entrapment, Conjunctiva normal, No discharge.   Respiratory: Dry cough, diffuse wheezing, No respiratory distress.     Cardiovascular:  Normal heart rate, Normal rhythm, No appreciable rubs or gallops.   GI:  Soft, No tenderness, No distension, No palpable masses  Musculoskeletal:  Intact distal pulses, No edema. Good range of motion in all major joints. No tenderness to palpation or major deformities noted.  Integument:  Warm, Dry, No erythema, No rash. "   Neurologic:  Alert & oriented, Normal motor function, Normal sensory function, No focal deficits noted.   Psychiatric:  Affect normal, Judgment normal, Mood normal.     LAB:  All pertinent labs reviewed and interpreted.       RADIOLOGY:  Reviewed all pertinent imaging. Please see official radiology report.  No orders to display       EKG:    Normal sinus rhythm.  Rate of 88.  Normal QRS.  Normal ST segments.  When compared to April 4, 2007 no significant changes  I have independently reviewed and interpreted the EKG(s) documented above.          I, Danna Bragg, am serving as a scribe to document services personally performed by Steven Zimmerman MD, based on my observation and the provider's statements to me. I, Steven Zimmerman MD attest that Danna Bragg is acting in a scribe capacity, has observed my performance of the services and has documented them in accordance with my direction.    Steven Zimmerman M.D.  Emergency Medicine  [facility] EMERGENCY ROOM     Steven Zimmerman MD  11/07/24 1244

## 2024-11-08 LAB
ATRIAL RATE - MUSE: 88 BPM
DIASTOLIC BLOOD PRESSURE - MUSE: NORMAL MMHG
INTERPRETATION ECG - MUSE: NORMAL
P AXIS - MUSE: 57 DEGREES
PR INTERVAL - MUSE: 138 MS
QRS DURATION - MUSE: 90 MS
QT - MUSE: 354 MS
QTC - MUSE: 428 MS
R AXIS - MUSE: -60 DEGREES
SYSTOLIC BLOOD PRESSURE - MUSE: NORMAL MMHG
T AXIS - MUSE: 29 DEGREES
VENTRICULAR RATE- MUSE: 88 BPM

## 2025-08-27 ENCOUNTER — HOSPITAL ENCOUNTER (EMERGENCY)
Facility: CLINIC | Age: 30
Discharge: HOME | End: 2025-08-27
Attending: EMERGENCY MEDICINE | Admitting: EMERGENCY MEDICINE
Payer: OTHER MISCELLANEOUS

## 2025-08-27 VITALS
TEMPERATURE: 97.8 F | RESPIRATION RATE: 18 BRPM | SYSTOLIC BLOOD PRESSURE: 129 MMHG | OXYGEN SATURATION: 100 % | HEART RATE: 71 BPM | HEIGHT: 65 IN | WEIGHT: 165 LBS | DIASTOLIC BLOOD PRESSURE: 79 MMHG | BODY MASS INDEX: 27.49 KG/M2
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
## 2025-08-27 DIAGNOSIS — M25.572 ACUTE LEFT ANKLE PAIN: ICD-10-CM

## 2025-08-27 DIAGNOSIS — M25.522 LEFT ELBOW PAIN: ICD-10-CM

## 2025-08-27 DIAGNOSIS — M25.532 LEFT WRIST PAIN: ICD-10-CM

## 2025-08-27 DIAGNOSIS — M25.512 ACUTE PAIN OF LEFT SHOULDER: ICD-10-CM

## 2025-08-27 DIAGNOSIS — T14.8XXA SKIN ABRASION: ICD-10-CM

## 2025-08-27 LAB
HOLD SPECIMEN: NORMAL

## 2025-08-27 PROCEDURE — 96374 THER/PROPH/DIAG INJ IV PUSH: CPT | Mod: 59

## 2025-08-27 PROCEDURE — 96375 TX/PRO/DX INJ NEW DRUG ADDON: CPT

## 2025-08-27 PROCEDURE — 99285 EMERGENCY DEPT VISIT HI MDM: CPT | Mod: 25

## 2025-08-27 PROCEDURE — 36415 COLL VENOUS BLD VENIPUNCTURE: CPT | Performed by: EMERGENCY MEDICINE

## 2025-08-27 PROCEDURE — 99285 EMERGENCY DEPT VISIT HI MDM: CPT | Mod: 25 | Performed by: EMERGENCY MEDICINE

## 2025-08-27 PROCEDURE — 250N000011 HC RX IP 250 OP 636: Performed by: EMERGENCY MEDICINE

## 2025-08-27 RX ORDER — METHOCARBAMOL 500 MG/1
500 TABLET, FILM COATED ORAL 3 TIMES DAILY PRN
Qty: 12 TABLET | Refills: 0 | Status: SHIPPED | OUTPATIENT
Start: 2025-08-27

## 2025-08-27 RX ORDER — ONDANSETRON 2 MG/ML
4 INJECTION INTRAMUSCULAR; INTRAVENOUS ONCE
Status: COMPLETED | OUTPATIENT
Start: 2025-08-27 | End: 2025-08-27

## 2025-08-27 RX ORDER — MORPHINE SULFATE 4 MG/ML
4 INJECTION, SOLUTION INTRAMUSCULAR; INTRAVENOUS ONCE
Refills: 0 | Status: COMPLETED | OUTPATIENT
Start: 2025-08-27 | End: 2025-08-27

## 2025-08-27 RX ORDER — KETOROLAC TROMETHAMINE 15 MG/ML
15 INJECTION, SOLUTION INTRAMUSCULAR; INTRAVENOUS ONCE
Status: COMPLETED | OUTPATIENT
Start: 2025-08-27 | End: 2025-08-27

## 2025-08-27 RX ADMIN — MORPHINE SULFATE 4 MG: 4 INJECTION, SOLUTION INTRAMUSCULAR; INTRAVENOUS at 13:04

## 2025-08-27 RX ADMIN — ONDANSETRON 4 MG: 2 INJECTION, SOLUTION INTRAMUSCULAR; INTRAVENOUS at 13:05

## 2025-08-27 RX ADMIN — KETOROLAC TROMETHAMINE 15 MG: 15 INJECTION, SOLUTION INTRAMUSCULAR; INTRAVENOUS at 13:05

## 2025-08-27 ASSESSMENT — COLUMBIA-SUICIDE SEVERITY RATING SCALE - C-SSRS
6. HAVE YOU EVER DONE ANYTHING, STARTED TO DO ANYTHING, OR PREPARED TO DO ANYTHING TO END YOUR LIFE?: NO
2. HAVE YOU ACTUALLY HAD ANY THOUGHTS OF KILLING YOURSELF IN THE PAST MONTH?: NO
1. IN THE PAST MONTH, HAVE YOU WISHED YOU WERE DEAD OR WISHED YOU COULD GO TO SLEEP AND NOT WAKE UP?: NO

## 2025-08-27 ASSESSMENT — ACTIVITIES OF DAILY LIVING (ADL)
ADLS_ACUITY_SCORE: 41